# Patient Record
Sex: MALE | Race: WHITE | NOT HISPANIC OR LATINO | Employment: FULL TIME | RURAL
[De-identification: names, ages, dates, MRNs, and addresses within clinical notes are randomized per-mention and may not be internally consistent; named-entity substitution may affect disease eponyms.]

---

## 2018-07-10 ENCOUNTER — HISTORICAL (OUTPATIENT)
Dept: ADMINISTRATIVE | Facility: HOSPITAL | Age: 33
End: 2018-07-10

## 2018-07-12 LAB
LAB AP CLINICAL INFORMATION: NORMAL
LAB AP COMMENTS: NORMAL
LAB AP DIAGNOSIS - HISTORICAL: NORMAL
LAB AP GROSS PATHOLOGY - HISTORICAL: NORMAL
LAB AP SPECIMEN SUBMITTED - HISTORICAL: NORMAL

## 2020-12-21 ENCOUNTER — HISTORICAL (OUTPATIENT)
Dept: ADMINISTRATIVE | Facility: HOSPITAL | Age: 35
End: 2020-12-21

## 2020-12-21 LAB — SARS-COV+SARS-COV-2 AG RESP QL IA.RAPID: POSITIVE

## 2022-01-21 ENCOUNTER — OFFICE VISIT (OUTPATIENT)
Dept: FAMILY MEDICINE | Facility: CLINIC | Age: 37
End: 2022-01-21
Payer: COMMERCIAL

## 2022-01-21 VITALS
TEMPERATURE: 98 F | WEIGHT: 200 LBS | HEART RATE: 76 BPM | OXYGEN SATURATION: 98 % | DIASTOLIC BLOOD PRESSURE: 62 MMHG | SYSTOLIC BLOOD PRESSURE: 106 MMHG

## 2022-01-21 DIAGNOSIS — Z20.828 CONTACT WITH AND (SUSPECTED) EXPOSURE TO OTHER VIRAL COMMUNICABLE DISEASES: Primary | ICD-10-CM

## 2022-01-21 DIAGNOSIS — U07.1 COVID: ICD-10-CM

## 2022-01-21 DIAGNOSIS — J02.9 ACUTE PHARYNGITIS, UNSPECIFIED ETIOLOGY: ICD-10-CM

## 2022-01-21 LAB
CTP QC/QA: YES
SARS-COV-2 AG RESP QL IA.RAPID: POSITIVE

## 2022-01-21 PROCEDURE — 1159F PR MEDICATION LIST DOCUMENTED IN MEDICAL RECORD: ICD-10-PCS | Mod: CPTII,,, | Performed by: NURSE PRACTITIONER

## 2022-01-21 PROCEDURE — 87426 SARSCOV CORONAVIRUS AG IA: CPT | Mod: QW,,, | Performed by: NURSE PRACTITIONER

## 2022-01-21 PROCEDURE — 3074F PR MOST RECENT SYSTOLIC BLOOD PRESSURE < 130 MM HG: ICD-10-PCS | Mod: CPTII,,, | Performed by: NURSE PRACTITIONER

## 2022-01-21 PROCEDURE — 3078F DIAST BP <80 MM HG: CPT | Mod: CPTII,,, | Performed by: NURSE PRACTITIONER

## 2022-01-21 PROCEDURE — 1159F MED LIST DOCD IN RCRD: CPT | Mod: CPTII,,, | Performed by: NURSE PRACTITIONER

## 2022-01-21 PROCEDURE — 87426 SARS CORONAVIRUS 2 ANTIGEN POCT: ICD-10-PCS | Mod: QW,,, | Performed by: NURSE PRACTITIONER

## 2022-01-21 PROCEDURE — 3078F PR MOST RECENT DIASTOLIC BLOOD PRESSURE < 80 MM HG: ICD-10-PCS | Mod: CPTII,,, | Performed by: NURSE PRACTITIONER

## 2022-01-21 PROCEDURE — 3074F SYST BP LT 130 MM HG: CPT | Mod: CPTII,,, | Performed by: NURSE PRACTITIONER

## 2022-01-21 PROCEDURE — 99203 PR OFFICE/OUTPT VISIT, NEW, LEVL III, 30-44 MIN: ICD-10-PCS | Mod: ,,, | Performed by: NURSE PRACTITIONER

## 2022-01-21 PROCEDURE — 99203 OFFICE O/P NEW LOW 30 MIN: CPT | Mod: ,,, | Performed by: NURSE PRACTITIONER

## 2022-01-21 RX ORDER — AMOXICILLIN 875 MG/1
875 TABLET, FILM COATED ORAL EVERY 12 HOURS
Qty: 20 TABLET | Refills: 0 | Status: SHIPPED | OUTPATIENT
Start: 2022-01-21 | End: 2022-01-31

## 2022-01-21 NOTE — PROGRESS NOTES
Subjective:       Patient ID: Gilbert Sosa is a 37 y.o. male.    Chief Complaint: Sore Throat and Cough    Covid Concerns  Sore throat and cough    Sore Throat   Associated symptoms include coughing. Pertinent negatives include no abdominal pain, congestion, ear pain, headaches, neck pain, shortness of breath or vomiting.   Cough  Associated symptoms include a sore throat. Pertinent negatives include no chest pain, ear pain, fever, headaches, rash or shortness of breath.     Review of Systems   Constitutional: Negative for appetite change, fatigue and fever.   HENT: Positive for sore throat. Negative for nasal congestion and ear pain.    Eyes: Negative for pain, discharge and itching.   Respiratory: Positive for cough. Negative for shortness of breath.    Cardiovascular: Negative for chest pain and leg swelling.   Gastrointestinal: Negative for abdominal pain, change in bowel habit, nausea, vomiting and change in bowel habit.   Musculoskeletal: Negative for back pain, gait problem and neck pain.   Integumentary:  Negative for rash and wound.   Allergic/Immunologic: Negative for immunocompromised state.   Neurological: Negative for dizziness, weakness and headaches.   All other systems reviewed and are negative.        Objective:      Physical Exam  Vitals and nursing note reviewed.   Constitutional:       General: He is not in acute distress.     Appearance: Normal appearance. He is not ill-appearing, toxic-appearing or diaphoretic.   HENT:      Head: Normocephalic.      Right Ear: Tympanic membrane, ear canal and external ear normal.      Left Ear: Tympanic membrane, ear canal and external ear normal.      Nose: Congestion present. No rhinorrhea.      Mouth/Throat:      Mouth: Mucous membranes are moist.      Pharynx: Posterior oropharyngeal erythema present. No oropharyngeal exudate.   Eyes:      General: No scleral icterus.        Right eye: No discharge.         Left eye: No discharge.      Extraocular  Movements: Extraocular movements intact.      Conjunctiva/sclera: Conjunctivae normal.      Pupils: Pupils are equal, round, and reactive to light.   Cardiovascular:      Rate and Rhythm: Normal rate and regular rhythm.      Pulses: Normal pulses.      Heart sounds: Normal heart sounds. No murmur heard.      Pulmonary:      Effort: Pulmonary effort is normal. No respiratory distress.      Breath sounds: Normal breath sounds. No wheezing, rhonchi or rales.   Musculoskeletal:         General: Normal range of motion.      Cervical back: Neck supple. No tenderness.   Lymphadenopathy:      Cervical: Cervical adenopathy present.   Skin:     General: Skin is warm and dry.      Capillary Refill: Capillary refill takes less than 2 seconds.      Findings: No rash.   Neurological:      Mental Status: He is alert and oriented to person, place, and time.   Psychiatric:         Mood and Affect: Mood normal.         Behavior: Behavior normal.         Thought Content: Thought content normal.         Judgment: Judgment normal.            Assessment:       1. Contact with and (suspected) exposure to other viral communicable diseases    2. Acute pharyngitis, unspecified etiology    3. COVID        Plan:   Contact with and (suspected) exposure to other viral communicable diseases  -     SARS Coronavirus 2 Antigen, POCT    Acute pharyngitis, unspecified etiology  -     amoxicillin (AMOXIL) 875 MG tablet; Take 1 tablet (875 mg total) by mouth every 12 (twelve) hours. for 10 days  Dispense: 20 tablet; Refill: 0    COVID

## 2022-01-21 NOTE — LETTER
January 21, 2022      [facility]  [address] [place] [address] [place] [address]  Phone: [phone]  Fax: [phone]       Patient: Gilbert Sosa   YOB: 1985  Date of Visit: 01/21/2022    To Whom It May Concern:    Sonam Sosa  was at [facility] on 01/21/2022. The patient may return to work/school on 01/28/2022 with no restrictions. If you have any questions or concerns, or if I can be of further assistance, please do not hesitate to contact me.    Sincerely,    Medina Purvis LPN

## 2022-09-08 ENCOUNTER — HOSPITAL ENCOUNTER (OUTPATIENT)
Facility: HOSPITAL | Age: 37
Discharge: HOME | End: 2022-09-09
Attending: SURGERY | Admitting: SURGERY
Payer: COMMERCIAL

## 2022-09-08 DIAGNOSIS — K37 APPENDICITIS, UNSPECIFIED APPENDICITIS TYPE: Primary | ICD-10-CM

## 2022-09-08 DIAGNOSIS — R10.31 RIGHT LOWER QUADRANT ABDOMINAL PAIN: ICD-10-CM

## 2022-09-08 LAB
ALBUMIN SERPL BCP-MCNC: 3.9 G/DL (ref 3.5–5)
ALBUMIN/GLOB SERPL: 1.3 {RATIO}
ALP SERPL-CCNC: 38 U/L (ref 45–115)
ALT SERPL W P-5'-P-CCNC: 72 U/L (ref 16–61)
ANION GAP SERPL CALCULATED.3IONS-SCNC: 10 MMOL/L (ref 7–16)
AST SERPL W P-5'-P-CCNC: 49 U/L (ref 15–37)
BASOPHILS # BLD AUTO: 0.02 K/UL (ref 0–0.2)
BASOPHILS NFR BLD AUTO: 0.3 % (ref 0–1)
BILIRUB SERPL-MCNC: 0.8 MG/DL (ref ?–1.2)
BUN SERPL-MCNC: 18 MG/DL (ref 7–18)
BUN/CREAT SERPL: 19 (ref 6–20)
CALCIUM SERPL-MCNC: 8.9 MG/DL (ref 8.5–10.1)
CHLORIDE SERPL-SCNC: 104 MMOL/L (ref 98–107)
CO2 SERPL-SCNC: 25 MMOL/L (ref 21–32)
CREAT SERPL-MCNC: 0.94 MG/DL (ref 0.7–1.3)
DIFFERENTIAL METHOD BLD: ABNORMAL
EGFR (NO RACE VARIABLE) (RUSH/TITUS): 107 ML/MIN/1.73M²
EOSINOPHIL # BLD AUTO: 0.04 K/UL (ref 0–0.5)
EOSINOPHIL NFR BLD AUTO: 0.5 % (ref 1–4)
ERYTHROCYTE [DISTWIDTH] IN BLOOD BY AUTOMATED COUNT: 11.8 % (ref 11.5–14.5)
GLOBULIN SER-MCNC: 3.1 G/DL (ref 2–4)
GLUCOSE SERPL-MCNC: 82 MG/DL (ref 74–106)
HCT VFR BLD AUTO: 39.2 % (ref 40–54)
HGB BLD-MCNC: 14.1 G/DL (ref 13.5–18)
IMM GRANULOCYTES # BLD AUTO: 0.02 K/UL (ref 0–0.04)
IMM GRANULOCYTES NFR BLD: 0.3 % (ref 0–0.4)
LYMPHOCYTES # BLD AUTO: 0.98 K/UL (ref 1–4.8)
LYMPHOCYTES NFR BLD AUTO: 13.4 % (ref 27–41)
MCH RBC QN AUTO: 33.4 PG (ref 27–31)
MCHC RBC AUTO-ENTMCNC: 36 G/DL (ref 32–36)
MCV RBC AUTO: 92.9 FL (ref 80–96)
MONOCYTES # BLD AUTO: 0.51 K/UL (ref 0–0.8)
MONOCYTES NFR BLD AUTO: 7 % (ref 2–6)
MPC BLD CALC-MCNC: 9.7 FL (ref 9.4–12.4)
NEUTROPHILS # BLD AUTO: 5.76 K/UL (ref 1.8–7.7)
NEUTROPHILS NFR BLD AUTO: 78.5 % (ref 53–65)
NRBC # BLD AUTO: 0 X10E3/UL
NRBC, AUTO (.00): 0 %
PLATELET # BLD AUTO: 216 K/UL (ref 150–400)
POTASSIUM SERPL-SCNC: 3.8 MMOL/L (ref 3.5–5.1)
PROT SERPL-MCNC: 7 G/DL (ref 6.4–8.2)
RBC # BLD AUTO: 4.22 M/UL (ref 4.6–6.2)
SODIUM SERPL-SCNC: 135 MMOL/L (ref 136–145)
WBC # BLD AUTO: 7.33 K/UL (ref 4.5–11)

## 2022-09-08 PROCEDURE — 25500020 PHARM REV CODE 255: Performed by: NURSE PRACTITIONER

## 2022-09-08 PROCEDURE — 63600175 PHARM REV CODE 636 W HCPCS: Performed by: NURSE PRACTITIONER

## 2022-09-08 PROCEDURE — 99284 PR EMERGENCY DEPT VISIT,LEVEL IV: ICD-10-PCS | Mod: ,,, | Performed by: NURSE PRACTITIONER

## 2022-09-08 PROCEDURE — 96374 THER/PROPH/DIAG INJ IV PUSH: CPT

## 2022-09-08 PROCEDURE — 99284 EMERGENCY DEPT VISIT MOD MDM: CPT | Mod: ,,, | Performed by: NURSE PRACTITIONER

## 2022-09-08 PROCEDURE — C9113 INJ PANTOPRAZOLE SODIUM, VIA: HCPCS | Performed by: NURSE PRACTITIONER

## 2022-09-08 PROCEDURE — 36415 COLL VENOUS BLD VENIPUNCTURE: CPT | Performed by: NURSE PRACTITIONER

## 2022-09-08 PROCEDURE — 80053 COMPREHEN METABOLIC PANEL: CPT | Performed by: NURSE PRACTITIONER

## 2022-09-08 PROCEDURE — 96375 TX/PRO/DX INJ NEW DRUG ADDON: CPT

## 2022-09-08 PROCEDURE — 85025 COMPLETE CBC W/AUTO DIFF WBC: CPT | Performed by: NURSE PRACTITIONER

## 2022-09-08 PROCEDURE — 99285 EMERGENCY DEPT VISIT HI MDM: CPT | Mod: 25

## 2022-09-08 RX ORDER — MORPHINE SULFATE 4 MG/ML
4 INJECTION, SOLUTION INTRAMUSCULAR; INTRAVENOUS EVERY 6 HOURS PRN
Status: DISCONTINUED | OUTPATIENT
Start: 2022-09-08 | End: 2022-09-09 | Stop reason: HOSPADM

## 2022-09-08 RX ORDER — SODIUM CHLORIDE, SODIUM LACTATE, POTASSIUM CHLORIDE, CALCIUM CHLORIDE 600; 310; 30; 20 MG/100ML; MG/100ML; MG/100ML; MG/100ML
INJECTION, SOLUTION INTRAVENOUS CONTINUOUS
Status: DISCONTINUED | OUTPATIENT
Start: 2022-09-08 | End: 2022-09-09 | Stop reason: HOSPADM

## 2022-09-08 RX ORDER — ONDANSETRON 2 MG/ML
4 INJECTION INTRAMUSCULAR; INTRAVENOUS EVERY 6 HOURS PRN
Status: DISCONTINUED | OUTPATIENT
Start: 2022-09-08 | End: 2022-09-09 | Stop reason: HOSPADM

## 2022-09-08 RX ORDER — PANTOPRAZOLE SODIUM 40 MG/10ML
40 INJECTION, POWDER, LYOPHILIZED, FOR SOLUTION INTRAVENOUS
Status: COMPLETED | OUTPATIENT
Start: 2022-09-08 | End: 2022-09-08

## 2022-09-08 RX ADMIN — PANTOPRAZOLE SODIUM 40 MG: 40 INJECTION, POWDER, FOR SOLUTION INTRAVENOUS at 10:09

## 2022-09-08 RX ADMIN — SODIUM CHLORIDE, POTASSIUM CHLORIDE, SODIUM LACTATE AND CALCIUM CHLORIDE: 600; 310; 30; 20 INJECTION, SOLUTION INTRAVENOUS at 09:09

## 2022-09-08 RX ADMIN — MORPHINE SULFATE 4 MG: 4 INJECTION INTRAVENOUS at 10:09

## 2022-09-08 RX ADMIN — IOPAMIDOL 100 ML: 755 INJECTION, SOLUTION INTRAVENOUS at 07:09

## 2022-09-08 RX ADMIN — ONDANSETRON 4 MG: 2 INJECTION INTRAMUSCULAR; INTRAVENOUS at 10:09

## 2022-09-09 ENCOUNTER — ANESTHESIA (OUTPATIENT)
Dept: SURGERY | Facility: HOSPITAL | Age: 37
End: 2022-09-09
Payer: COMMERCIAL

## 2022-09-09 ENCOUNTER — ANESTHESIA EVENT (OUTPATIENT)
Dept: SURGERY | Facility: HOSPITAL | Age: 37
End: 2022-09-09
Payer: COMMERCIAL

## 2022-09-09 VITALS
RESPIRATION RATE: 16 BRPM | BODY MASS INDEX: 29.62 KG/M2 | HEART RATE: 57 BPM | TEMPERATURE: 98 F | WEIGHT: 200 LBS | SYSTOLIC BLOOD PRESSURE: 109 MMHG | OXYGEN SATURATION: 92 % | DIASTOLIC BLOOD PRESSURE: 58 MMHG | HEIGHT: 69 IN

## 2022-09-09 PROBLEM — R10.31 RIGHT LOWER QUADRANT ABDOMINAL PAIN: Status: ACTIVE | Noted: 2022-09-09

## 2022-09-09 PROBLEM — K35.30 ACUTE APPENDICITIS WITH LOCALIZED PERITONITIS, WITHOUT PERFORATION OR GANGRENE: Status: ACTIVE | Noted: 2022-09-09

## 2022-09-09 LAB
FLUAV AG UPPER RESP QL IA.RAPID: NEGATIVE
FLUBV AG UPPER RESP QL IA.RAPID: NEGATIVE
SARS-COV+SARS-COV-2 AG RESP QL IA.RAPID: NEGATIVE

## 2022-09-09 PROCEDURE — 25000003 PHARM REV CODE 250: Performed by: NURSE ANESTHETIST, CERTIFIED REGISTERED

## 2022-09-09 PROCEDURE — 36000709 HC OR TIME LEV III EA ADD 15 MIN: Performed by: SURGERY

## 2022-09-09 PROCEDURE — D9220A PRA ANESTHESIA: Mod: CRNA,,, | Performed by: NURSE ANESTHETIST, CERTIFIED REGISTERED

## 2022-09-09 PROCEDURE — 37000009 HC ANESTHESIA EA ADD 15 MINS: Performed by: SURGERY

## 2022-09-09 PROCEDURE — 36000708 HC OR TIME LEV III 1ST 15 MIN: Performed by: SURGERY

## 2022-09-09 PROCEDURE — 71000015 HC POSTOP RECOV 1ST HR: Performed by: SURGERY

## 2022-09-09 PROCEDURE — 44970 PR LAP,APPENDECTOMY: ICD-10-PCS | Mod: ,,, | Performed by: SURGERY

## 2022-09-09 PROCEDURE — 99223 1ST HOSP IP/OBS HIGH 75: CPT | Mod: 57,,, | Performed by: SURGERY

## 2022-09-09 PROCEDURE — 71000033 HC RECOVERY, INTIAL HOUR: Performed by: SURGERY

## 2022-09-09 PROCEDURE — 63600175 PHARM REV CODE 636 W HCPCS: Performed by: NURSE ANESTHETIST, CERTIFIED REGISTERED

## 2022-09-09 PROCEDURE — 37000008 HC ANESTHESIA 1ST 15 MINUTES: Performed by: SURGERY

## 2022-09-09 PROCEDURE — 25000003 PHARM REV CODE 250: Performed by: ANESTHESIOLOGY

## 2022-09-09 PROCEDURE — D9220A PRA ANESTHESIA: ICD-10-PCS | Mod: ANES,,, | Performed by: ANESTHESIOLOGY

## 2022-09-09 PROCEDURE — D9220A PRA ANESTHESIA: ICD-10-PCS | Mod: CRNA,,, | Performed by: NURSE ANESTHETIST, CERTIFIED REGISTERED

## 2022-09-09 PROCEDURE — D9220A PRA ANESTHESIA: Mod: ANES,,, | Performed by: ANESTHESIOLOGY

## 2022-09-09 PROCEDURE — 27201423 OPTIME MED/SURG SUP & DEVICES STERILE SUPPLY: Performed by: SURGERY

## 2022-09-09 PROCEDURE — 63600175 PHARM REV CODE 636 W HCPCS: Performed by: ANESTHESIOLOGY

## 2022-09-09 PROCEDURE — 44970 LAPAROSCOPY APPENDECTOMY: CPT | Mod: ,,, | Performed by: SURGERY

## 2022-09-09 PROCEDURE — 99223 PR INITIAL HOSPITAL CARE,LEVL III: ICD-10-PCS | Mod: 57,,, | Performed by: SURGERY

## 2022-09-09 PROCEDURE — 71000016 HC POSTOP RECOV ADDL HR: Performed by: SURGERY

## 2022-09-09 PROCEDURE — 87428 SARSCOV & INF VIR A&B AG IA: CPT | Performed by: SURGERY

## 2022-09-09 PROCEDURE — 11000001 HC ACUTE MED/SURG PRIVATE ROOM

## 2022-09-09 RX ORDER — LIDOCAINE HYDROCHLORIDE 20 MG/ML
INJECTION, SOLUTION EPIDURAL; INFILTRATION; INTRACAUDAL; PERINEURAL
Status: DISCONTINUED | OUTPATIENT
Start: 2022-09-09 | End: 2022-09-09

## 2022-09-09 RX ORDER — MORPHINE SULFATE 10 MG/ML
4 INJECTION INTRAMUSCULAR; INTRAVENOUS; SUBCUTANEOUS EVERY 5 MIN PRN
Status: DISCONTINUED | OUTPATIENT
Start: 2022-09-09 | End: 2022-09-09 | Stop reason: HOSPADM

## 2022-09-09 RX ORDER — FENTANYL CITRATE 50 UG/ML
INJECTION, SOLUTION INTRAMUSCULAR; INTRAVENOUS
Status: DISCONTINUED | OUTPATIENT
Start: 2022-09-09 | End: 2022-09-09

## 2022-09-09 RX ORDER — ONDANSETRON 2 MG/ML
INJECTION INTRAMUSCULAR; INTRAVENOUS
Status: DISCONTINUED | OUTPATIENT
Start: 2022-09-09 | End: 2022-09-09

## 2022-09-09 RX ORDER — OXYCODONE HYDROCHLORIDE 5 MG/1
5 TABLET ORAL EVERY 6 HOURS PRN
Qty: 15 TABLET | Refills: 0 | Status: SHIPPED | OUTPATIENT
Start: 2022-09-09 | End: 2023-12-19

## 2022-09-09 RX ORDER — DEXAMETHASONE SODIUM PHOSPHATE 4 MG/ML
INJECTION, SOLUTION INTRA-ARTICULAR; INTRALESIONAL; INTRAMUSCULAR; INTRAVENOUS; SOFT TISSUE
Status: DISCONTINUED | OUTPATIENT
Start: 2022-09-09 | End: 2022-09-09

## 2022-09-09 RX ORDER — OXYCODONE HYDROCHLORIDE 5 MG/1
5 TABLET ORAL
Status: DISCONTINUED | OUTPATIENT
Start: 2022-09-09 | End: 2022-09-09 | Stop reason: HOSPADM

## 2022-09-09 RX ORDER — SODIUM CHLORIDE, SODIUM LACTATE, POTASSIUM CHLORIDE, CALCIUM CHLORIDE 600; 310; 30; 20 MG/100ML; MG/100ML; MG/100ML; MG/100ML
125 INJECTION, SOLUTION INTRAVENOUS CONTINUOUS
Status: DISCONTINUED | OUTPATIENT
Start: 2022-09-09 | End: 2022-09-09 | Stop reason: HOSPADM

## 2022-09-09 RX ORDER — HYDROMORPHONE HYDROCHLORIDE 2 MG/ML
0.5 INJECTION, SOLUTION INTRAMUSCULAR; INTRAVENOUS; SUBCUTANEOUS EVERY 5 MIN PRN
Status: DISCONTINUED | OUTPATIENT
Start: 2022-09-09 | End: 2022-09-09 | Stop reason: HOSPADM

## 2022-09-09 RX ORDER — CEFAZOLIN SODIUM 1 G/3ML
INJECTION, POWDER, FOR SOLUTION INTRAMUSCULAR; INTRAVENOUS
Status: DISCONTINUED | OUTPATIENT
Start: 2022-09-09 | End: 2022-09-09

## 2022-09-09 RX ORDER — SUCCINYLCHOLINE CHLORIDE 20 MG/ML
INJECTION INTRAMUSCULAR; INTRAVENOUS
Status: DISCONTINUED | OUTPATIENT
Start: 2022-09-09 | End: 2022-09-09

## 2022-09-09 RX ORDER — PROPOFOL 10 MG/ML
VIAL (ML) INTRAVENOUS
Status: DISCONTINUED | OUTPATIENT
Start: 2022-09-09 | End: 2022-09-09

## 2022-09-09 RX ORDER — ROCURONIUM BROMIDE 10 MG/ML
INJECTION, SOLUTION INTRAVENOUS
Status: DISCONTINUED | OUTPATIENT
Start: 2022-09-09 | End: 2022-09-09

## 2022-09-09 RX ORDER — MIDAZOLAM HYDROCHLORIDE 1 MG/ML
INJECTION INTRAMUSCULAR; INTRAVENOUS
Status: DISCONTINUED | OUTPATIENT
Start: 2022-09-09 | End: 2022-09-09

## 2022-09-09 RX ORDER — MEPERIDINE HYDROCHLORIDE 25 MG/ML
25 INJECTION INTRAMUSCULAR; INTRAVENOUS; SUBCUTANEOUS EVERY 10 MIN PRN
Status: DISCONTINUED | OUTPATIENT
Start: 2022-09-09 | End: 2022-09-09 | Stop reason: HOSPADM

## 2022-09-09 RX ORDER — DIPHENHYDRAMINE HYDROCHLORIDE 50 MG/ML
25 INJECTION INTRAMUSCULAR; INTRAVENOUS EVERY 6 HOURS PRN
Status: DISCONTINUED | OUTPATIENT
Start: 2022-09-09 | End: 2022-09-09 | Stop reason: HOSPADM

## 2022-09-09 RX ORDER — ONDANSETRON 2 MG/ML
4 INJECTION INTRAMUSCULAR; INTRAVENOUS DAILY PRN
Status: DISCONTINUED | OUTPATIENT
Start: 2022-09-09 | End: 2022-09-09 | Stop reason: HOSPADM

## 2022-09-09 RX ADMIN — ROCURONIUM BROMIDE 20 MG: 10 INJECTION, SOLUTION INTRAVENOUS at 07:09

## 2022-09-09 RX ADMIN — ROCURONIUM BROMIDE 30 MG: 10 INJECTION, SOLUTION INTRAVENOUS at 07:09

## 2022-09-09 RX ADMIN — CEFAZOLIN 2 G: 1 INJECTION, POWDER, FOR SOLUTION INTRAMUSCULAR; INTRAVENOUS; PARENTERAL at 07:09

## 2022-09-09 RX ADMIN — MORPHINE SULFATE 4 MG: 10 INJECTION INTRAVENOUS at 08:09

## 2022-09-09 RX ADMIN — SUCCINYLCHOLINE CHLORIDE 100 MG: 20 INJECTION, SOLUTION INTRAMUSCULAR; INTRAVENOUS at 07:09

## 2022-09-09 RX ADMIN — MIDAZOLAM 2 MG: 1 INJECTION INTRAMUSCULAR; INTRAVENOUS at 07:09

## 2022-09-09 RX ADMIN — SUGAMMADEX 200 MG: 100 INJECTION, SOLUTION INTRAVENOUS at 08:09

## 2022-09-09 RX ADMIN — OXYCODONE HYDROCHLORIDE 5 MG: 5 TABLET ORAL at 09:09

## 2022-09-09 RX ADMIN — SODIUM CHLORIDE, POTASSIUM CHLORIDE, SODIUM LACTATE AND CALCIUM CHLORIDE: 600; 310; 30; 20 INJECTION, SOLUTION INTRAVENOUS at 07:09

## 2022-09-09 RX ADMIN — FENTANYL CITRATE 100 MCG: 50 INJECTION INTRAMUSCULAR; INTRAVENOUS at 08:09

## 2022-09-09 RX ADMIN — LIDOCAINE HYDROCHLORIDE 100 MG: 20 INJECTION, SOLUTION INTRAVENOUS at 07:09

## 2022-09-09 RX ADMIN — DEXAMETHASONE SODIUM PHOSPHATE 8 MG: 4 INJECTION, SOLUTION INTRA-ARTICULAR; INTRALESIONAL; INTRAMUSCULAR; INTRAVENOUS; SOFT TISSUE at 07:09

## 2022-09-09 RX ADMIN — PROPOFOL 200 MG: 10 INJECTION, EMULSION INTRAVENOUS at 07:09

## 2022-09-09 RX ADMIN — ONDANSETRON 8 MG: 2 INJECTION INTRAMUSCULAR; INTRAVENOUS at 07:09

## 2022-09-09 RX ADMIN — FENTANYL CITRATE 100 MCG: 50 INJECTION INTRAMUSCULAR; INTRAVENOUS at 07:09

## 2022-09-09 NOTE — ANESTHESIA POSTPROCEDURE EVALUATION
Anesthesia Post Evaluation    Patient: Gilbert Sosa    Procedure(s) Performed: Procedure(s) (LRB):  APPENDECTOMY, LAPAROSCOPIC (N/A)    Final Anesthesia Type: general      Patient location during evaluation: PACU  Patient participation: Yes- Able to Participate  Level of consciousness: awake and sedated  Post-procedure vital signs: reviewed and stable  Pain management: adequate  Airway patency: patent    PONV status at discharge: No PONV  Anesthetic complications: no      Cardiovascular status: blood pressure returned to baseline  Respiratory status: unassisted  Hydration status: euvolemic  Follow-up not needed.          Vitals Value Taken Time   /58 09/09/22 0947   Temp 36.8 °C (98.2 °F) 09/09/22 0947   Pulse 69 09/09/22 0923   Resp 16 09/09/22 0927   SpO2 94 % 09/09/22 0923   Vitals shown include unvalidated device data.      Event Time   Out of Recovery 09/09/2022 08:46:00         Pain/Tacos Score: Pain Rating Prior to Med Admin: 4 (9/9/2022  9:27 AM)  Pain Rating Post Med Admin: 3 (9/9/2022  8:35 AM)  Tacos Score: 10 (9/9/2022  9:47 AM)  Modified Tacos Score: 20 (9/9/2022  9:47 AM)

## 2022-09-09 NOTE — ED PROVIDER NOTES
"Encounter Date: 9/8/2022       History     Chief Complaint   Patient presents with    Abdominal Pain     38 y/o WM with no significant PMH presents with c/o RLQ abd pain that started approximately 10AM. Reports he thought he was constipated or having "gas" but his pain has progressively worsened with associated nausea. He denies vomiting, fever or chills. He has taken nothing for his symptoms. There are no exacerbating or remitting factors.     The history is provided by the patient.   Review of patient's allergies indicates:  No Known Allergies  Past Medical History:   Diagnosis Date    GERD (gastroesophageal reflux disease)      History reviewed. No pertinent surgical history.  History reviewed. No pertinent family history.  Social History     Tobacco Use    Smoking status: Never    Smokeless tobacco: Never   Substance Use Topics    Alcohol use: Not Currently    Drug use: Never     Review of Systems   Constitutional:  Negative for chills and fever.   Respiratory:  Negative for cough, chest tightness and shortness of breath.    Cardiovascular:  Negative for chest pain, palpitations and leg swelling.   Gastrointestinal:  Positive for abdominal pain and nausea. Negative for constipation, diarrhea and vomiting.   Genitourinary:  Negative for dysuria, hematuria, penile swelling, scrotal swelling and testicular pain.   All other systems reviewed and are negative.    Physical Exam     Initial Vitals [09/08/22 1803]   BP Pulse Resp Temp SpO2   125/84 92 16 98.4 °F (36.9 °C) 98 %      MAP       --         Physical Exam    Constitutional: He appears well-developed and well-nourished. He is active and cooperative.   Cardiovascular:  Normal rate, regular rhythm, normal heart sounds and normal pulses.           Pulmonary/Chest: Effort normal and breath sounds normal.   Abdominal: Abdomen is soft. Bowel sounds are normal. There is abdominal tenderness in the right lower quadrant. There is rebound. positive Rovsing's " sign    Neurological: He is alert and oriented to person, place, and time.   Skin: Skin is warm, dry and intact. Capillary refill takes less than 2 seconds.   Psychiatric: He has a normal mood and affect. His speech is normal and behavior is normal. Judgment and thought content normal. Cognition and memory are normal.       Medical Screening Exam   See Full Note    ED Course   Procedures  Labs Reviewed   COMPREHENSIVE METABOLIC PANEL - Abnormal; Notable for the following components:       Result Value    Sodium 135 (*)     Alk Phos 38 (*)     ALT 72 (*)     AST 49 (*)     All other components within normal limits   CBC WITH DIFFERENTIAL - Abnormal; Notable for the following components:    RBC 4.22 (*)     Hematocrit 39.2 (*)     MCH 33.4 (*)     Neutrophils % 78.5 (*)     Lymphocytes % 13.4 (*)     Monocytes % 7.0 (*)     Eosinophils % 0.5 (*)     Lymphocytes, Absolute 0.98 (*)     All other components within normal limits   CBC W/ AUTO DIFFERENTIAL    Narrative:     The following orders were created for panel order CBC auto differential.  Procedure                               Abnormality         Status                     ---------                               -----------         ------                     CBC with Differential[[id number]]        Abnormal            Final result                 Please view results for these tests on the individual orders.          Imaging Results              CT Abdomen Pelvis With Contrast (Final result)  Result time 09/08/22 20:15:39      Final result by Jorgito Gan II, MD (09/08/22 20:15:39)                   Impression:      Fatty liver infiltration.  Flash filling hemangiomas.  The appendix is slightly thickened with enhancement but minimal adjacent stranding, early appendicitis cannot be excluded.  No evidence of abnormality demonstrated.      Electronically signed by: Jorgito Gan  Date:    09/08/2022  Time:    20:15               Narrative:    EXAMINATION:  CT  ABDOMEN PELVIS WITH CONTRAST    CLINICAL HISTORY:  Nausea/vomiting;RLQ abdominal pain (Age >= 14y);    TECHNIQUE:  Axial CT imaging of the abdomen and pelvis is performed with intravenous and oral contrast. Contrast dose is 100 cc Isovue 370.    CT dose reduction technique used - Dose Rite and tube current modulation.    COMPARISON:  None available    FINDINGS:  Cardiac and lung bases are within normal limits    CT abdomen: The liver is diffusely decreased in density.  Flash filling hemangiomas present in the superior right lobe.  Spleen, pancreas and adrenal glands are normal in size and enhancement.  No evidence of focal lesion is demonstrated in these solid organs.    Kidneys are normal in size and enhancement.  No evidence of hydronephrosis or nephrolithiasis is seen.    The bowel caliber is normal and no wall thickening or adjacent inflammatory change is seen.  No evidence of free fluid or free air is present.  Appendix is slightly thickened at 9.9 mm with enhancement..    CT pelvis: The pelvic bowel appears within normal limits.  Bladder shows no evidence of abnormality.  The pelvic organs show no evidence of abnormality.                                       Medications   pantoprazole injection 40 mg (has no administration in time range)   lactated ringers infusion (has no administration in time range)   morphine injection 4 mg (has no administration in time range)   ondansetron injection 4 mg (has no administration in time range)   iopamidoL (ISOVUE-370) injection 100 mL (100 mLs Intravenous Given 9/8/22 1950)     Medical Decision Making:   ED Management:  Discussed patient with Dr. Murphy, surgeon on call. Will keep pt NPO and treat with IVF, analgesics and antiemetics. Dr. Murphy to see patient in AM.                  Clinical Impression:   Final diagnoses:  [K37] Appendicitis, unspecified appendicitis type (Primary)             ABNER Ortiz  09/08/22 8408

## 2022-09-09 NOTE — SUBJECTIVE & OBJECTIVE
No current facility-administered medications on file prior to encounter.     No current outpatient medications on file prior to encounter.       Review of patient's allergies indicates:  No Known Allergies    Past Medical History:   Diagnosis Date    GERD (gastroesophageal reflux disease)      History reviewed. No pertinent surgical history.  Family History    None       Tobacco Use    Smoking status: Never    Smokeless tobacco: Never   Substance and Sexual Activity    Alcohol use: Not Currently    Drug use: Never    Sexual activity: Not on file     Review of Systems   Constitutional:  Positive for activity change.   Gastrointestinal:  Positive for abdominal pain.   Objective:     Vital Signs (Most Recent):  Temp: 98.4 °F (36.9 °C) (09/08/22 1803)  Pulse: (!) 55 (09/09/22 0313)  Resp: 18 (09/09/22 0313)  BP: (!) 95/55 (09/09/22 0313)  SpO2: 98 % (09/09/22 0313)   Vital Signs (24h Range):  Temp:  [98.4 °F (36.9 °C)] 98.4 °F (36.9 °C)  Pulse:  [55-92] 55  Resp:  [15-18] 18  SpO2:  [98 %] 98 %  BP: ()/(55-85) 95/55     Weight: 90.7 kg (200 lb)  Body mass index is 29.53 kg/m².    Physical Exam  Constitutional:       Appearance: Normal appearance.   Cardiovascular:      Rate and Rhythm: Normal rate.   Pulmonary:      Effort: Pulmonary effort is normal. No respiratory distress.      Breath sounds: No stridor.   Abdominal:      General: Abdomen is flat.      Palpations: Abdomen is soft.      Tenderness: There is abdominal tenderness. There is guarding.   Musculoskeletal:         General: Normal range of motion.   Skin:     General: Skin is warm and dry.      Capillary Refill: Capillary refill takes less than 2 seconds.   Neurological:      General: No focal deficit present.      Mental Status: He is alert.   Psychiatric:         Mood and Affect: Mood normal.         Behavior: Behavior normal.         Thought Content: Thought content normal.         Judgment: Judgment normal.       Significant Labs:  I have reviewed  all pertinent lab results within the past 24 hours.  CBC:   Recent Labs   Lab 09/08/22  1844   WBC 7.33   RBC 4.22*   HGB 14.1   HCT 39.2*      MCV 92.9   MCH 33.4*   MCHC 36.0     BMP:   Recent Labs   Lab 09/08/22  1844   GLU 82   *   K 3.8      CO2 25   BUN 18   CREATININE 0.94   CALCIUM 8.9     CMP:   Recent Labs   Lab 09/08/22  1844   GLU 82   CALCIUM 8.9   ALBUMIN 3.9   PROT 7.0   *   K 3.8   CO2 25      BUN 18   CREATININE 0.94   ALKPHOS 38*   ALT 72*   AST 49*   BILITOT 0.8     LFTs:   Recent Labs   Lab 09/08/22  1844   ALT 72*   AST 49*   ALKPHOS 38*   BILITOT 0.8   PROT 7.0   ALBUMIN 3.9       Significant Diagnostics:  I have reviewed all pertinent imaging results/findings within the past 24 hours.

## 2022-09-09 NOTE — OP NOTE
[facility] - Periop Services  General Surgery  Operative Note    SUMMARY     Date of Procedure: 9/9/2022     Procedure: Procedure(s) (LRB):  APPENDECTOMY, LAPAROSCOPIC (N/A)       Surgeon(s) and Role:     * Dasia Murphy MD - Primary    Assisting Surgeon: None      Presents with acute appendicitis pros and cons of lap versus open appendectomy address       Pre-Operative Diagnosis: Appendicitis, unspecified appendicitis type [K37]  Right lower quadrant abdominal pain [R10.31]    Post-Operative Diagnosis: Post-Op Diagnosis Codes:     * Appendicitis, unspecified appendicitis type [K37]     * Right lower quadrant abdominal pain [R10.31]    Anesthesia: General    Operative Findings (including complications, if any):  Acute erythematous appendicitis involving the distal 3rd of the appendiceal tip    Description of Technical Procedures:  Taken operative suite abdomen prepped draped preop antibiotics given curvilinear infraumbilical incision made lateral stay sutures in the fascia.  Sharply in the abdomen.  Pneumoperitoneum assured.  Head-down left lateral position.  5 mm trocar left lower abdomen 5 mm trocar in the right upper quadrant.  Identified thickened indurated erythematous distal portion of the appendix tip.  The mesoappendix was taken down with LigaSure.  Base identified.  Single fire of the Endo-NIEVES was applied.  Hemostasis was assured irrigated aspirated.  Inspected the abdomen no other pathology appreciated.  Trocars removed pneumoperitoneum released the appendix had been removed by placing it within an Endo-Catch bag.  Brought out through the umbilical site.  Fascial defect closed with 0 Vicryl skin with Monocryl    Significant Surgical Tasks Conducted by the Assistant(s), if Applicable:  Skin closure    Estimated Blood Loss (EBL): * No values recorded between 9/9/2022  7:40 AM and 9/9/2022  8:15 AM *           Implants: * No implants in log *    Specimens:   Specimen (24h ago, onward)       Start      Ordered    09/09/22 0751  Surgical Pathology  RELEASE UPON ORDERING         09/09/22 0751                            Condition: Good    Disposition: PACU - hemodynamically stable.    Attestation: I was present and scrubbed for the entire procedure.

## 2022-09-09 NOTE — H&P
[facility] - Emergency Department  General Surgery  History & Physical    Patient Name: Gilbert Sosa  MRN: [id number]  Admission Date: 9/8/2022  Attending Physician: Dasia Murphy MD  Primary Care Provider: Primary Doctor No    Patient information was obtained from patient and ER records.     Subjective:     Chief Complaint/Reason for Admission: rlq apbdominal pain    History of Present Illness: Starte 10 am  Nausea cramps  Migrated RLQ  Persitent  No improvement  Ct with thickened appendix      No current facility-administered medications on file prior to encounter.     No current outpatient medications on file prior to encounter.       Review of patient's allergies indicates:  No Known Allergies    Past Medical History:   Diagnosis Date    GERD (gastroesophageal reflux disease)      History reviewed. No pertinent surgical history.  Family History    None       Tobacco Use    Smoking status: Never    Smokeless tobacco: Never   Substance and Sexual Activity    Alcohol use: Not Currently    Drug use: Never    Sexual activity: Not on file     Review of Systems   Constitutional:  Positive for activity change.   Gastrointestinal:  Positive for abdominal pain.   Objective:     Vital Signs (Most Recent):  Temp: 98.4 °F (36.9 °C) (09/08/22 1803)  Pulse: (!) 55 (09/09/22 0313)  Resp: 18 (09/09/22 0313)  BP: (!) 95/55 (09/09/22 0313)  SpO2: 98 % (09/09/22 0313)   Vital Signs (24h Range):  Temp:  [98.4 °F (36.9 °C)] 98.4 °F (36.9 °C)  Pulse:  [55-92] 55  Resp:  [15-18] 18  SpO2:  [98 %] 98 %  BP: ()/(55-85) 95/55     Weight: 90.7 kg (200 lb)  Body mass index is 29.53 kg/m².    Physical Exam  Constitutional:       Appearance: Normal appearance.   Cardiovascular:      Rate and Rhythm: Normal rate.   Pulmonary:      Effort: Pulmonary effort is normal. No respiratory distress.      Breath sounds: No stridor.   Abdominal:      General: Abdomen is flat.      Palpations: Abdomen is soft.      Tenderness:  There is abdominal tenderness. There is guarding.   Musculoskeletal:         General: Normal range of motion.   Skin:     General: Skin is warm and dry.      Capillary Refill: Capillary refill takes less than 2 seconds.   Neurological:      General: No focal deficit present.      Mental Status: He is alert.   Psychiatric:         Mood and Affect: Mood normal.         Behavior: Behavior normal.         Thought Content: Thought content normal.         Judgment: Judgment normal.       Significant Labs:  I have reviewed all pertinent lab results within the past 24 hours.  CBC:   Recent Labs   Lab 09/08/22 1844   WBC 7.33   RBC 4.22*   HGB 14.1   HCT 39.2*      MCV 92.9   MCH 33.4*   MCHC 36.0     BMP:   Recent Labs   Lab 09/08/22 1844   GLU 82   *   K 3.8      CO2 25   BUN 18   CREATININE 0.94   CALCIUM 8.9     CMP:   Recent Labs   Lab 09/08/22 1844   GLU 82   CALCIUM 8.9   ALBUMIN 3.9   PROT 7.0   *   K 3.8   CO2 25      BUN 18   CREATININE 0.94   ALKPHOS 38*   ALT 72*   AST 49*   BILITOT 0.8     LFTs:   Recent Labs   Lab 09/08/22 1844   ALT 72*   AST 49*   ALKPHOS 38*   BILITOT 0.8   PROT 7.0   ALBUMIN 3.9       Significant Diagnostics:  I have reviewed all pertinent imaging results/findings within the past 24 hours.      Assessment/Plan:     No notes have been filed under this hospital service.  Service: General Surgery    VTE Risk Mitigation (From admission, onward)    None          Dasia Murphy MD  General Surgery  [facility] - Emergency Department

## 2022-09-09 NOTE — ANESTHESIA PREPROCEDURE EVALUATION
09/09/2022  Gilbert Sosa is a 37 y.o., male.      Pre-op Assessment    I have reviewed the Patient Summary Reports.     I have reviewed the Nursing Notes. I have reviewed the NPO Status.   I have reviewed the Medications.     Review of Systems  Anesthesia Hx:  No problems with previous Anesthesia    Social:  Non-Smoker, No Alcohol Use    Hematology/Oncology:  Hematology Normal   Oncology Normal     EENT/Dental:EENT/Dental Normal   Cardiovascular:  Cardiovascular Normal     Pulmonary:  Pulmonary Normal    Renal/:  Renal/ Normal     Hepatic/GI:  Hepatic/GI Normal appendicitis   Musculoskeletal:  Musculoskeletal Normal    Neurological:  Neurology Normal    Endocrine:  Endocrine Normal    Dermatological:  Skin Normal    Psych:  Psychiatric Normal           Physical Exam  General: Well nourished    Airway:  Mallampati: III / III  Mouth Opening: Normal  TM Distance: > 6 cm  Tongue: Normal  Neck ROM: Normal ROM    Chest/Lungs:  Clear to auscultation, Normal Respiratory Rate    Heart:  Rate: Normal  Rhythm: Regular Rhythm        Anesthesia Plan  Type of Anesthesia, risks & benefits discussed:    Anesthesia Type: Gen ETT  Intra-op Monitoring Plan: Standard ASA Monitors  Post Op Pain Control Plan: multimodal analgesia  Induction:  IV  Informed Consent: Informed consent signed with the Patient and all parties understand the risks and agree with anesthesia plan.  All questions answered.   ASA Score: 2 Emergent  Day of Surgery Review of History & Physical: H&P Update referred to the surgeon/provider.I have interviewed and examined the patient. I have reviewed the patient's H&P dated: There are no significant changes. H&P completed by Anesthesiologist.    Ready For Surgery From Anesthesia Perspective.     .

## 2022-09-09 NOTE — ANESTHESIA PROCEDURE NOTES
Intubation    Date/Time: 9/9/2022 7:29 AM  Performed by: Jim Scott CRNA  Authorized by: Yoshi Valverde MD     Intubation:     Induction:  Rapid sequence induction    Intubated:  Postinduction    Attempts:  1    Attempted By:  CRNA    Method of Intubation:  Direct    Blade:  Andrea 4    Laryngeal View Grade: Grade I - full view of cords      Difficult Airway Encountered?: No      Complications:  None    Airway Device:  Oral endotracheal tube    Airway Device Size:  7.0    Style/Cuff Inflation:  Cuffed    Inflation Amount (mL):  7    Tube secured:  23    Secured at:  The lips    Placement Verified By:  Capnometry    Complicating Factors:  None    Findings Post-Intubation:  BS equal bilateral and atraumatic/condition of teeth unchanged

## 2022-09-09 NOTE — TRANSFER OF CARE
"Anesthesia Transfer of Care Note    Patient: Gilbert Sosa    Procedure(s) Performed: Procedure(s) (LRB):  APPENDECTOMY, LAPAROSCOPIC (N/A)    Patient location: PACU    Anesthesia Type: general    Transport from OR: Transported from OR on 100% O2 by closed face mask with adequate spontaneous ventilation    Post pain: adequate analgesia    Post assessment: no apparent anesthetic complications    Post vital signs: stable    Level of consciousness: responds to stimulation    Nausea/Vomiting: no nausea/vomiting    Complications: none    Transfer of care protocol was followed      Last vitals:   Visit Vitals  /72 (BP Location: Right arm, Patient Position: Lying)   Pulse 88   Temp 37 °C (98.6 °F) (Oral)   Resp 17   Ht 5' 9" (1.753 m)   Wt 90.7 kg (200 lb)   SpO2 98%   BMI 29.53 kg/m²     "

## 2022-09-09 NOTE — PROGRESS NOTES
No complaints/needs voiced. Nad noted. Vss. Safety measures ongoing. Report given. Care released. 95%, 65, 16, 110/66

## 2022-09-12 LAB
ESTROGEN SERPL-MCNC: NORMAL PG/ML
INSULIN SERPL-ACNC: NORMAL U[IU]/ML
LAB AP GROSS DESCRIPTION: NORMAL
LAB AP LABORATORY NOTES: NORMAL
T3RU NFR SERPL: NORMAL %

## 2022-10-06 ENCOUNTER — OFFICE VISIT (OUTPATIENT)
Dept: SURGERY | Facility: CLINIC | Age: 37
End: 2022-10-06
Payer: COMMERCIAL

## 2022-10-06 DIAGNOSIS — Z09 POSTOP CHECK: Primary | ICD-10-CM

## 2022-10-06 PROCEDURE — 99024 POSTOP FOLLOW-UP VISIT: CPT | Mod: S$PBB,,, | Performed by: NURSE PRACTITIONER

## 2022-10-06 PROCEDURE — 99024 PR POST-OP FOLLOW-UP VISIT: ICD-10-PCS | Mod: S$PBB,,, | Performed by: NURSE PRACTITIONER

## 2022-10-06 PROCEDURE — 1159F MED LIST DOCD IN RCRD: CPT | Mod: CPTII,,, | Performed by: NURSE PRACTITIONER

## 2022-10-06 PROCEDURE — 1160F PR REVIEW ALL MEDS BY PRESCRIBER/CLIN PHARMACIST DOCUMENTED: ICD-10-PCS | Mod: CPTII,,, | Performed by: NURSE PRACTITIONER

## 2022-10-06 PROCEDURE — 1160F RVW MEDS BY RX/DR IN RCRD: CPT | Mod: CPTII,,, | Performed by: NURSE PRACTITIONER

## 2022-10-06 PROCEDURE — 1159F PR MEDICATION LIST DOCUMENTED IN MEDICAL RECORD: ICD-10-PCS | Mod: CPTII,,, | Performed by: NURSE PRACTITIONER

## 2022-10-06 PROCEDURE — 99213 OFFICE O/P EST LOW 20 MIN: CPT | Mod: PBBFAC | Performed by: NURSE PRACTITIONER

## 2022-10-06 NOTE — PROGRESS NOTES
Gilbert Sosa is a 37 y.o. male patient.   No diagnosis found.  Past Medical History:   Diagnosis Date    GERD (gastroesophageal reflux disease)      No past surgical history pertinent negatives on file.  Scheduled Meds:  Continuous Infusions:  PRN Meds:    Review of patient's allergies indicates:  No Known Allergies  There are no hospital problems to display for this patient.    There were no vitals taken for this visit.    Subjective  doing well no complaints  Objective abdomen soft nontender incisions healing well   Assessment & Plan     Status post laparoscopy appendectomy for acute appendicitis on 09/09/2022 per Dr. Murphy educated on diet activity care of incisions call or return p.r.n. problems    Erendira Sinclair, CHOCO  10/6/2022

## 2023-08-25 ENCOUNTER — OFFICE VISIT (OUTPATIENT)
Dept: FAMILY MEDICINE | Facility: CLINIC | Age: 38
End: 2023-08-25
Payer: COMMERCIAL

## 2023-08-25 VITALS
OXYGEN SATURATION: 97 % | DIASTOLIC BLOOD PRESSURE: 80 MMHG | RESPIRATION RATE: 18 BRPM | WEIGHT: 192.81 LBS | BODY MASS INDEX: 28.56 KG/M2 | TEMPERATURE: 99 F | HEIGHT: 69 IN | SYSTOLIC BLOOD PRESSURE: 120 MMHG | HEART RATE: 91 BPM

## 2023-08-25 DIAGNOSIS — J06.9 VIRAL URI: Primary | ICD-10-CM

## 2023-08-25 DIAGNOSIS — Z11.59 ENCOUNTER FOR SCREENING FOR VIRAL DISEASE: ICD-10-CM

## 2023-08-25 LAB
CTP QC/QA: YES
FLUAV AG NPH QL: NEGATIVE
FLUBV AG NPH QL: NEGATIVE
S PYO RRNA THROAT QL PROBE: NEGATIVE
SARS-COV-2 AG RESP QL IA.RAPID: NEGATIVE

## 2023-08-25 PROCEDURE — 3008F BODY MASS INDEX DOCD: CPT | Mod: CPTII,,, | Performed by: NURSE PRACTITIONER

## 2023-08-25 PROCEDURE — 87426 SARSCOV CORONAVIRUS AG IA: CPT | Mod: QW,,, | Performed by: NURSE PRACTITIONER

## 2023-08-25 PROCEDURE — 3008F PR BODY MASS INDEX (BMI) DOCUMENTED: ICD-10-PCS | Mod: CPTII,,, | Performed by: NURSE PRACTITIONER

## 2023-08-25 PROCEDURE — 3079F DIAST BP 80-89 MM HG: CPT | Mod: CPTII,,, | Performed by: NURSE PRACTITIONER

## 2023-08-25 PROCEDURE — 3074F SYST BP LT 130 MM HG: CPT | Mod: CPTII,,, | Performed by: NURSE PRACTITIONER

## 2023-08-25 PROCEDURE — 87880 POCT RAPID STREP A: ICD-10-PCS | Mod: QW,,, | Performed by: NURSE PRACTITIONER

## 2023-08-25 PROCEDURE — 1160F RVW MEDS BY RX/DR IN RCRD: CPT | Mod: CPTII,,, | Performed by: NURSE PRACTITIONER

## 2023-08-25 PROCEDURE — 3074F PR MOST RECENT SYSTOLIC BLOOD PRESSURE < 130 MM HG: ICD-10-PCS | Mod: CPTII,,, | Performed by: NURSE PRACTITIONER

## 2023-08-25 PROCEDURE — 87804 POCT INFLUENZA A/B: ICD-10-PCS | Mod: 59,QW,, | Performed by: NURSE PRACTITIONER

## 2023-08-25 PROCEDURE — 99214 PR OFFICE/OUTPT VISIT, EST, LEVL IV, 30-39 MIN: ICD-10-PCS | Mod: ,,, | Performed by: NURSE PRACTITIONER

## 2023-08-25 PROCEDURE — 1160F PR REVIEW ALL MEDS BY PRESCRIBER/CLIN PHARMACIST DOCUMENTED: ICD-10-PCS | Mod: CPTII,,, | Performed by: NURSE PRACTITIONER

## 2023-08-25 PROCEDURE — 1159F MED LIST DOCD IN RCRD: CPT | Mod: CPTII,,, | Performed by: NURSE PRACTITIONER

## 2023-08-25 PROCEDURE — 1159F PR MEDICATION LIST DOCUMENTED IN MEDICAL RECORD: ICD-10-PCS | Mod: CPTII,,, | Performed by: NURSE PRACTITIONER

## 2023-08-25 PROCEDURE — 99214 OFFICE O/P EST MOD 30 MIN: CPT | Mod: ,,, | Performed by: NURSE PRACTITIONER

## 2023-08-25 PROCEDURE — 87426 SARS CORONAVIRUS 2 ANTIGEN POCT: ICD-10-PCS | Mod: QW,,, | Performed by: NURSE PRACTITIONER

## 2023-08-25 PROCEDURE — 3079F PR MOST RECENT DIASTOLIC BLOOD PRESSURE 80-89 MM HG: ICD-10-PCS | Mod: CPTII,,, | Performed by: NURSE PRACTITIONER

## 2023-08-25 PROCEDURE — 87880 STREP A ASSAY W/OPTIC: CPT | Mod: QW,,, | Performed by: NURSE PRACTITIONER

## 2023-08-25 PROCEDURE — 87804 INFLUENZA ASSAY W/OPTIC: CPT | Mod: 59,QW,, | Performed by: NURSE PRACTITIONER

## 2023-08-25 RX ORDER — PROMETHAZINE HYDROCHLORIDE AND DEXTROMETHORPHAN HYDROBROMIDE 6.25; 15 MG/5ML; MG/5ML
5 SYRUP ORAL EVERY 6 HOURS PRN
Qty: 150 ML | Refills: 0 | Status: SHIPPED | OUTPATIENT
Start: 2023-08-25 | End: 2023-09-04

## 2023-08-25 NOTE — PROGRESS NOTES
"Subjective:       Patient ID: Gilbert Sosa is a 38 y.o. male.    Chief Complaint: Cough (Onset this morning), Sore Throat (Onset yesterday), Headache (Onset this morning), and Fever (Low grade. Onset this morning)    Presents to clinic as above. Daughter has covid. He has been taking care of her.       Review of Systems   Constitutional:  Positive for chills, fever and malaise/fatigue.   HENT:  Positive for congestion, ear pain and sore throat.    Respiratory:  Positive for cough.    Cardiovascular: Negative.    Gastrointestinal: Negative.    Musculoskeletal:  Positive for myalgias.   Neurological:  Positive for headaches.          Reviewed family, medical, surgical, and social history.    Objective:      /80 (BP Location: Left arm, Patient Position: Sitting, BP Method: Large (Manual))   Pulse 91   Temp 98.8 °F (37.1 °C) (Oral)   Resp 18   Ht 5' 9" (1.753 m)   Wt 87.5 kg (192 lb 12.8 oz)   SpO2 97%   BMI 28.47 kg/m²   Physical Exam  Vitals and nursing note reviewed.   Constitutional:       General: He is not in acute distress.     Appearance: Normal appearance. He is normal weight. He is not ill-appearing, toxic-appearing or diaphoretic.   HENT:      Head: Normocephalic.      Right Ear: Hearing, tympanic membrane, ear canal and external ear normal.      Left Ear: Hearing, tympanic membrane, ear canal and external ear normal.      Nose: Mucosal edema, congestion and rhinorrhea present. Rhinorrhea is clear.      Right Turbinates: Enlarged and swollen.      Left Turbinates: Enlarged and swollen.      Right Sinus: No maxillary sinus tenderness or frontal sinus tenderness.      Left Sinus: No maxillary sinus tenderness or frontal sinus tenderness.      Mouth/Throat:      Lips: Pink.      Mouth: Mucous membranes are moist.      Pharynx: Uvula midline. Posterior oropharyngeal erythema present. No pharyngeal swelling, oropharyngeal exudate or uvula swelling.      Tonsils: No tonsillar exudate or " tonsillar abscesses.   Cardiovascular:      Rate and Rhythm: Normal rate and regular rhythm.      Heart sounds: Normal heart sounds.   Pulmonary:      Effort: Pulmonary effort is normal.      Breath sounds: Normal breath sounds.   Musculoskeletal:      Cervical back: Normal range of motion and neck supple. No rigidity or tenderness.   Lymphadenopathy:      Cervical: No cervical adenopathy.   Skin:     General: Skin is warm and dry.   Neurological:      Mental Status: He is alert.   Psychiatric:         Mood and Affect: Mood normal.         Behavior: Behavior normal.         Thought Content: Thought content normal.         Judgment: Judgment normal.            Office Visit on 08/25/2023   Component Date Value Ref Range Status    Rapid Strep A Screen 08/25/2023 Negative  Negative Final     Acceptable 08/25/2023 Yes   Final    Rapid Influenza A Ag 08/25/2023 Negative  Negative Final    Rapid Influenza B Ag 08/25/2023 Negative  Negative Final     Acceptable 08/25/2023 Yes   Final    SARS Coronavirus 2 Antigen 08/25/2023 Negative  Negative Final     Acceptable 08/25/2023 Yes   Final      Assessment:       1. Viral URI    2. Encounter for screening for viral disease        Plan:       Viral URI  -     promethazine-dextromethorphan (PROMETHAZINE-DM) 6.25-15 mg/5 mL Syrp; Take 5 mLs by mouth every 6 (six) hours as needed (cough).  Dispense: 150 mL; Refill: 0    Encounter for screening for viral disease  -     POCT rapid strep A  -     POCT Influenza A/B Rapid Antigen  -     SARS Coronavirus 2 Antigen, POCT    Retest tomorrow  Drink plenty of fluids  OTC meds for fever  RTC PRN          Risks, benefits, and side effects were discussed with the patient. All questions were answered to the fullest satisfaction of the patient, and pt verbalized understanding and agreement to treatment plan. Pt was to call with any new or worsening symptoms, or present to the ER.

## 2023-12-19 ENCOUNTER — OFFICE VISIT (OUTPATIENT)
Dept: FAMILY MEDICINE | Facility: CLINIC | Age: 38
End: 2023-12-19
Payer: COMMERCIAL

## 2023-12-19 VITALS
RESPIRATION RATE: 20 BRPM | TEMPERATURE: 99 F | HEART RATE: 96 BPM | HEIGHT: 69 IN | WEIGHT: 199 LBS | SYSTOLIC BLOOD PRESSURE: 129 MMHG | BODY MASS INDEX: 29.47 KG/M2 | OXYGEN SATURATION: 97 % | DIASTOLIC BLOOD PRESSURE: 88 MMHG

## 2023-12-19 DIAGNOSIS — R50.9 FEVER, UNSPECIFIED FEVER CAUSE: Primary | ICD-10-CM

## 2023-12-19 DIAGNOSIS — J11.1 INFLUENZA: ICD-10-CM

## 2023-12-19 PROBLEM — R10.31 RIGHT LOWER QUADRANT ABDOMINAL PAIN: Status: RESOLVED | Noted: 2022-09-09 | Resolved: 2023-12-19

## 2023-12-19 PROBLEM — K35.30 ACUTE APPENDICITIS WITH LOCALIZED PERITONITIS, WITHOUT PERFORATION OR GANGRENE: Status: RESOLVED | Noted: 2022-09-09 | Resolved: 2023-12-19

## 2023-12-19 PROBLEM — J02.9 ACUTE PHARYNGITIS: Status: RESOLVED | Noted: 2022-01-21 | Resolved: 2023-12-19

## 2023-12-19 PROBLEM — U07.1 COVID: Status: RESOLVED | Noted: 2022-01-21 | Resolved: 2023-12-19

## 2023-12-19 PROBLEM — Z20.828 CONTACT WITH AND (SUSPECTED) EXPOSURE TO OTHER VIRAL COMMUNICABLE DISEASES: Status: RESOLVED | Noted: 2022-01-21 | Resolved: 2023-12-19

## 2023-12-19 LAB
CTP QC/QA: YES
CTP QC/QA: YES
FLUAV AG NPH QL: POSITIVE
FLUBV AG NPH QL: NEGATIVE
SARS-COV-2 RDRP RESP QL NAA+PROBE: NEGATIVE

## 2023-12-19 PROCEDURE — 87804 INFLUENZA ASSAY W/OPTIC: CPT | Mod: 59,QW,, | Performed by: NURSE PRACTITIONER

## 2023-12-19 PROCEDURE — 1160F RVW MEDS BY RX/DR IN RCRD: CPT | Mod: CPTII,,, | Performed by: NURSE PRACTITIONER

## 2023-12-19 PROCEDURE — 3074F SYST BP LT 130 MM HG: CPT | Mod: CPTII,,, | Performed by: NURSE PRACTITIONER

## 2023-12-19 PROCEDURE — 3074F PR MOST RECENT SYSTOLIC BLOOD PRESSURE < 130 MM HG: ICD-10-PCS | Mod: CPTII,,, | Performed by: NURSE PRACTITIONER

## 2023-12-19 PROCEDURE — 87635 SARS-COV-2 COVID-19 AMP PRB: CPT | Mod: QW,,, | Performed by: NURSE PRACTITIONER

## 2023-12-19 PROCEDURE — 3008F BODY MASS INDEX DOCD: CPT | Mod: CPTII,,, | Performed by: NURSE PRACTITIONER

## 2023-12-19 PROCEDURE — 87635: ICD-10-PCS | Mod: QW,,, | Performed by: NURSE PRACTITIONER

## 2023-12-19 PROCEDURE — 99213 OFFICE O/P EST LOW 20 MIN: CPT | Mod: ,,, | Performed by: NURSE PRACTITIONER

## 2023-12-19 PROCEDURE — 3079F PR MOST RECENT DIASTOLIC BLOOD PRESSURE 80-89 MM HG: ICD-10-PCS | Mod: CPTII,,, | Performed by: NURSE PRACTITIONER

## 2023-12-19 PROCEDURE — 99213 PR OFFICE/OUTPT VISIT, EST, LEVL III, 20-29 MIN: ICD-10-PCS | Mod: ,,, | Performed by: NURSE PRACTITIONER

## 2023-12-19 PROCEDURE — 1159F PR MEDICATION LIST DOCUMENTED IN MEDICAL RECORD: ICD-10-PCS | Mod: CPTII,,, | Performed by: NURSE PRACTITIONER

## 2023-12-19 PROCEDURE — 1159F MED LIST DOCD IN RCRD: CPT | Mod: CPTII,,, | Performed by: NURSE PRACTITIONER

## 2023-12-19 PROCEDURE — 1160F PR REVIEW ALL MEDS BY PRESCRIBER/CLIN PHARMACIST DOCUMENTED: ICD-10-PCS | Mod: CPTII,,, | Performed by: NURSE PRACTITIONER

## 2023-12-19 PROCEDURE — 87804 POCT INFLUENZA A/B: ICD-10-PCS | Mod: 59,QW,, | Performed by: NURSE PRACTITIONER

## 2023-12-19 PROCEDURE — 3079F DIAST BP 80-89 MM HG: CPT | Mod: CPTII,,, | Performed by: NURSE PRACTITIONER

## 2023-12-19 PROCEDURE — 3008F PR BODY MASS INDEX (BMI) DOCUMENTED: ICD-10-PCS | Mod: CPTII,,, | Performed by: NURSE PRACTITIONER

## 2023-12-19 RX ORDER — OSELTAMIVIR PHOSPHATE 75 MG/1
75 CAPSULE ORAL 2 TIMES DAILY
Qty: 10 CAPSULE | Refills: 0 | Status: SHIPPED | OUTPATIENT
Start: 2023-12-19 | End: 2023-12-24

## 2023-12-19 RX ORDER — BENZONATATE 100 MG/1
100 CAPSULE ORAL 3 TIMES DAILY PRN
Qty: 30 CAPSULE | Refills: 0 | Status: SHIPPED | OUTPATIENT
Start: 2023-12-19 | End: 2023-12-29

## 2023-12-19 NOTE — PROGRESS NOTES
ABNER Gresham        PATIENT NAME: Gilbert Sosa  : 1985  DATE: 23  MRN: [id number]      Patient PCP Information       Provider PCP Type    Primary Doctor No General            Reason for Visit / Chief Complaint: Fever, Cough, and URI (Patient presents to the clinic for uri/Rm1)       History of Present Illness / Problem Focused Workflow     Gilbert Sosa presents to the clinic with Fever, Cough, and URI (Patient presents to the clinic for uri/Rm1)     HPI  Patient presents to clinic with fever, cough and URI. Flu exposure. Symptoms started in last 2-3 days.       Review of Systems     Review of Systems   Constitutional:  Positive for fever. Negative for activity change, appetite change, chills, diaphoresis, fatigue and unexpected weight change.   HENT:  Positive for congestion. Negative for ear pain, facial swelling, hearing loss, nosebleeds and sore throat.    Eyes: Negative.    Respiratory:  Positive for cough. Negative for apnea, shortness of breath and wheezing.    Cardiovascular:  Negative for chest pain, palpitations and leg swelling.   Gastrointestinal:  Negative for abdominal distention, abdominal pain, blood in stool, constipation, diarrhea and nausea.   Endocrine: Negative for cold intolerance, heat intolerance, polydipsia, polyphagia and polyuria.   Genitourinary:  Negative for decreased urine volume, difficulty urinating, dysuria, flank pain, frequency, hematuria and urgency.   Musculoskeletal:  Negative for arthralgias, joint swelling and myalgias.   Skin:  Negative for color change and rash.   Allergic/Immunologic: Negative.    Neurological:  Negative for dizziness, tremors, seizures, syncope, facial asymmetry, speech difficulty, weakness, light-headedness, numbness and headaches.   Hematological:  Negative for adenopathy. Does not bruise/bleed easily.   Psychiatric/Behavioral:  Negative for behavioral problems and confusion.        Medical / Social / Family History  "    Past Medical History:   Diagnosis Date    GERD (gastroesophageal reflux disease)        Past Surgical History:   Procedure Laterality Date    APPENDECTOMY      LAPAROSCOPIC APPENDECTOMY N/A 09/09/2022    Procedure: APPENDECTOMY, LAPAROSCOPIC;  Surgeon: Dasia Murphy MD;  Location: [facility];  Service: General;  Laterality: N/A;       Social History    reports that he has never smoked. He has never been exposed to tobacco smoke. He has never used smokeless tobacco. He reports that he does not currently use alcohol. He reports that he does not use drugs.    Family History  's family history is not on file.    Medications and Allergies     Medications  No outpatient medications have been marked as taking for the 12/19/23 encounter (Office Visit) with Puja Owens FNP.       Allergies  Review of patient's allergies indicates:  No Known Allergies    Physical Examination     Vitals:    12/19/23 1318   BP: 129/88   BP Location: Right arm   Patient Position: Sitting   BP Method: Large (Automatic)   Pulse: 96   Resp: 20   Temp: 99.2 °F (37.3 °C)   TempSrc: Oral   SpO2: 97%   Weight: 90.3 kg (199 lb)   Height: 5' 9" (1.753 m)       Physical Exam  Vitals reviewed.   Constitutional:       Appearance: Normal appearance.   HENT:      Head: Normocephalic.      Right Ear: Tympanic membrane and external ear normal.      Left Ear: Tympanic membrane, ear canal and external ear normal.      Nose: Congestion and rhinorrhea present.      Mouth/Throat:      Mouth: Mucous membranes are moist.   Eyes:      Extraocular Movements: Extraocular movements intact.   Cardiovascular:      Rate and Rhythm: Normal rate and regular rhythm.      Pulses: Normal pulses.      Heart sounds: Normal heart sounds.   Pulmonary:      Effort: Pulmonary effort is normal.      Breath sounds: Normal breath sounds.   Musculoskeletal:         General: Normal range of motion.      Cervical back: Normal range of motion.   Skin:     General: Skin is " warm and dry.      Capillary Refill: Capillary refill takes less than 2 seconds.   Neurological:      General: No focal deficit present.      Mental Status: He is alert and oriented to person, place, and time.   Psychiatric:         Mood and Affect: Mood normal.         Behavior: Behavior normal.         Thought Content: Thought content normal.         Judgment: Judgment normal.           Office Visit on 12/19/2023   Component Date Value Ref Range Status    POC Rapid COVID 12/19/2023 Negative  Negative Final     Acceptable 12/19/2023 Yes   Final    Rapid Influenza A Ag 12/19/2023 Positive (A)  Negative Final    Rapid Influenza B Ag 12/19/2023 Negative  Negative Final     Acceptable 12/19/2023 Yes   Final             Assessment and Plan (including Health Maintenance)      Problem List  Smart Sets  Document Outside HM   :    Plan:   Fever, unspecified fever cause  -     POCT COVID-19 Rapid Screening  -     POCT Influenza A/B Rapid Antigen    Influenza  -     oseltamivir (TAMIFLU) 75 MG capsule; Take 1 capsule (75 mg total) by mouth 2 (two) times daily. for 5 days  Dispense: 10 capsule; Refill: 0  -     benzonatate (TESSALON) 100 MG capsule; Take 1 capsule (100 mg total) by mouth 3 (three) times daily as needed for Cough.  Dispense: 30 capsule; Refill: 0     Patient tested positive for influenza  Take tamiflu as directed  Tylenol/Motrin as needed as directed for fever/headaches/bodyaches   OTC cough medicine as needed for cough  RTC if symptoms persist or fail to improve  Adequate oral hydration     There are no Patient Instructions on file for this visit.       Health Maintenance Due   Topic Date Due    Hepatitis C Screening  Never done    Lipid Panel  Never done    COVID-19 Vaccine (1) Never done    HIV Screening  Never done    TETANUS VACCINE  Never done    Hemoglobin A1c (Diabetic Prevention Screening)  Never done    Influenza Vaccine (1) Never done         There is no immunization  history on file for this patient.     Problem List Items Addressed This Visit    None  Visit Diagnoses       Fever, unspecified fever cause    -  Primary    Relevant Orders    POCT COVID-19 Rapid Screening (Completed)    POCT Influenza A/B Rapid Antigen (Completed)    Influenza        Relevant Medications    oseltamivir (TAMIFLU) 75 MG capsule    benzonatate (TESSALON) 100 MG capsule            The patient has no Health Maintenance topics of status Not Due    No future appointments.         Signature:  ABNER Gresham  [facility]     Date of encounter: 12/19/23

## 2023-12-19 NOTE — LETTER
December 19, 2023    Gilbert Sosa  [address]  [place] [address]             [facility]  Family Medicine  [address]   [place] [address]  Phone: [phone]  Fax: [phone]   December 19, 2023     Patient: Gilbert Sosa   YOB: 1985   Date of Visit: 12/19/2023       To Whom it May Concern:    Gilbert Sosa was seen in my clinic on 12/18/2023. He may return to work on 12/22/2023 .    Please excuse him from any classes or work missed.    If you have any questions or concerns, please don't hesitate to call.    Sincerely,         Puja Owens, SONJAP

## (undated) DEVICE — SEALER LIGASURE LAP 37CM 5MM

## (undated) DEVICE — TROCAR KII BLLN 12MM 10CM

## (undated) DEVICE — SUT 0 VICRYL / UR6 (J603)

## (undated) DEVICE — TROCAR ENDO Z THREAD KII 5X100

## (undated) DEVICE — SOL NACL IRR 1000ML BTL

## (undated) DEVICE — DISSECTOR KITTNER 5MM T 45CM S

## (undated) DEVICE — GLOVE PROTEXIS PI SYN SURG 6.5

## (undated) DEVICE — GLOVE 6.0 PROTEXIS PI BLUE

## (undated) DEVICE — SEE MEDLINE ITEM 159592

## (undated) DEVICE — GLOVE 6.5 PROTEXIS PI BLUE

## (undated) DEVICE — WARMER BLUE HEAT SCOPE 3-12MM

## (undated) DEVICE — GLOVE PROTEXIS PI SYN SURG 7.5

## (undated) DEVICE — BAG TISS RETRV MONARCH 10MM

## (undated) DEVICE — SUT MONOCYRL 4-0 PS2 UND

## (undated) DEVICE — Device

## (undated) DEVICE — GOWN NONREINF SET-IN SLV 2XL

## (undated) DEVICE — CART STAPLE FLEX ETX 3.5MM BLU

## (undated) DEVICE — GLOVE 6.0 PROTEXIS PI MICRO

## (undated) DEVICE — STAPLER INT LINEAR ARTC 3.5-45

## (undated) DEVICE — TRAY CATH FOL SIL URIMTR 16FR

## (undated) DEVICE — STRIP MEDI WND CLSR 1X5IN

## (undated) DEVICE — IRRIGATOR ENDOSCOPY DISP.

## (undated) DEVICE — ADHESIVE MASTISOL VIAL 48/BX